# Patient Record
Sex: FEMALE | Race: WHITE | URBAN - METROPOLITAN AREA
[De-identification: names, ages, dates, MRNs, and addresses within clinical notes are randomized per-mention and may not be internally consistent; named-entity substitution may affect disease eponyms.]

---

## 2023-01-13 ENCOUNTER — OFFICE VISIT (OUTPATIENT)
Facility: LOCATION | Age: 53
End: 2023-01-13
Payer: COMMERCIAL

## 2023-01-13 DIAGNOSIS — R51.9 HEADACHE, UNSPECIFIED: Primary | ICD-10-CM

## 2023-01-13 DIAGNOSIS — H04.123 TEAR FILM INSUFFICIENCY OF BILATERAL LACRIMAL GLANDS: ICD-10-CM

## 2023-01-13 DIAGNOSIS — H52.4 PRESBYOPIA: ICD-10-CM

## 2023-01-13 PROCEDURE — 92133 CPTRZD OPH DX IMG PST SGM ON: CPT

## 2023-01-13 PROCEDURE — 92004 COMPRE OPH EXAM NEW PT 1/>: CPT

## 2023-01-13 ASSESSMENT — INTRAOCULAR PRESSURE
OS: 15
OD: 15

## 2023-01-13 ASSESSMENT — KERATOMETRY
OS: 45.38
OD: 45.50

## 2023-01-13 NOTE — IMPRESSION/PLAN
Impression: Headache, unspecified: R51.9. Plan: No retinal pathology seen today. RNFL OCT ordered today was normal. Recommend continued care with neurologist. Pt expressed understanding. Monitor. Hx of benign IIH Will have pt back for 30-2VF

## 2023-01-13 NOTE — IMPRESSION/PLAN
Impression: Presbyopia: H52.4. Plan: Recommended getting MRx and wearing glasses for best vision. Made aware of refraction fee.

## 2023-03-03 ENCOUNTER — OFFICE VISIT (OUTPATIENT)
Facility: LOCATION | Age: 53
End: 2023-03-03
Payer: COMMERCIAL

## 2023-03-03 DIAGNOSIS — R51.9 HEADACHE, UNSPECIFIED: Primary | ICD-10-CM

## 2023-03-03 PROCEDURE — 92012 INTRM OPH EXAM EST PATIENT: CPT

## 2023-03-03 PROCEDURE — 92083 EXTENDED VISUAL FIELD XM: CPT

## 2023-03-03 ASSESSMENT — INTRAOCULAR PRESSURE
OD: 12
OS: 12

## 2023-03-03 NOTE — IMPRESSION/PLAN
Impression: Headache, unspecified: R51.9.

30-2VF: non specific defects OS, OD was full RNFL OCT: no edema or thinning OU Plan: No retinal pathology seen today. 30-2VF ordered today showed non specific defects OS, OD was full. Recommend continued care with neurologist. Pt expressed understanding. Monitor.  

Hx of benign IIH

RTC for f/u with RNFL OCT